# Patient Record
Sex: MALE | Race: WHITE | NOT HISPANIC OR LATINO | URBAN - METROPOLITAN AREA
[De-identification: names, ages, dates, MRNs, and addresses within clinical notes are randomized per-mention and may not be internally consistent; named-entity substitution may affect disease eponyms.]

---

## 2017-04-13 ENCOUNTER — OUTPATIENT (OUTPATIENT)
Dept: OUTPATIENT SERVICES | Facility: HOSPITAL | Age: 57
LOS: 1 days | Discharge: HOME | End: 2017-04-13
Payer: COMMERCIAL

## 2017-04-13 DIAGNOSIS — M54.16 RADICULOPATHY, LUMBAR REGION: ICD-10-CM

## 2017-04-13 PROCEDURE — 62322 NJX INTERLAMINAR LMBR/SAC: CPT

## 2017-04-13 PROCEDURE — 77003 FLUOROGUIDE FOR SPINE INJECT: CPT

## 2017-08-03 ENCOUNTER — OUTPATIENT (OUTPATIENT)
Dept: OUTPATIENT SERVICES | Facility: HOSPITAL | Age: 57
LOS: 1 days | Discharge: HOME | End: 2017-08-03
Payer: COMMERCIAL

## 2017-08-03 DIAGNOSIS — M54.16 RADICULOPATHY, LUMBAR REGION: ICD-10-CM

## 2017-08-03 PROCEDURE — 77003 FLUOROGUIDE FOR SPINE INJECT: CPT

## 2017-08-03 PROCEDURE — 62323 NJX INTERLAMINAR LMBR/SAC: CPT

## 2017-09-11 ENCOUNTER — OUTPATIENT (OUTPATIENT)
Dept: OUTPATIENT SERVICES | Facility: HOSPITAL | Age: 57
LOS: 1 days | Discharge: HOME | End: 2017-09-11
Payer: COMMERCIAL

## 2017-09-11 DIAGNOSIS — M47.817 SPONDYLOSIS WITHOUT MYELOPATHY OR RADICULOPATHY, LUMBOSACRAL REGION: ICD-10-CM

## 2017-09-11 PROCEDURE — 64635 DESTROY LUMB/SAC FACET JNT: CPT | Mod: LT

## 2017-09-11 PROCEDURE — 77003 FLUOROGUIDE FOR SPINE INJECT: CPT

## 2017-09-11 PROCEDURE — 64636 DESTROY L/S FACET JNT ADDL: CPT | Mod: LT

## 2017-09-25 ENCOUNTER — OUTPATIENT (OUTPATIENT)
Dept: OUTPATIENT SERVICES | Facility: HOSPITAL | Age: 57
LOS: 1 days | End: 2017-09-25
Payer: COMMERCIAL

## 2017-09-25 DIAGNOSIS — M47.817 SPONDYLOSIS WITHOUT MYELOPATHY OR RADICULOPATHY, LUMBOSACRAL REGION: ICD-10-CM

## 2017-09-25 PROCEDURE — 77003 FLUOROGUIDE FOR SPINE INJECT: CPT

## 2017-09-25 PROCEDURE — 64635 DESTROY LUMB/SAC FACET JNT: CPT | Mod: RT

## 2017-09-25 PROCEDURE — 64636 DESTROY L/S FACET JNT ADDL: CPT | Mod: RT

## 2020-12-03 PROBLEM — Z00.00 ENCOUNTER FOR PREVENTIVE HEALTH EXAMINATION: Status: ACTIVE | Noted: 2020-12-03

## 2020-12-09 ENCOUNTER — APPOINTMENT (OUTPATIENT)
Dept: PULMONOLOGY | Facility: CLINIC | Age: 60
End: 2020-12-09

## 2020-12-23 ENCOUNTER — APPOINTMENT (OUTPATIENT)
Dept: PULMONOLOGY | Facility: CLINIC | Age: 60
End: 2020-12-23
Payer: OTHER MISCELLANEOUS

## 2020-12-23 VITALS
HEIGHT: 62 IN | DIASTOLIC BLOOD PRESSURE: 60 MMHG | RESPIRATION RATE: 16 BRPM | BODY MASS INDEX: 36.44 KG/M2 | OXYGEN SATURATION: 99 % | WEIGHT: 198 LBS | SYSTOLIC BLOOD PRESSURE: 120 MMHG | HEART RATE: 73 BPM

## 2020-12-23 DIAGNOSIS — Z82.5 FAMILY HISTORY OF ASTHMA AND OTHER CHRONIC LOWER RESPIRATORY DISEASES: ICD-10-CM

## 2020-12-23 DIAGNOSIS — Z83.518 FAMILY HISTORY OF OTHER SPECIFIED EYE DISORDER: ICD-10-CM

## 2020-12-23 DIAGNOSIS — I10 ESSENTIAL (PRIMARY) HYPERTENSION: ICD-10-CM

## 2020-12-23 DIAGNOSIS — K61.1 RECTAL ABSCESS: ICD-10-CM

## 2020-12-23 DIAGNOSIS — Z80.1 FAMILY HISTORY OF MALIGNANT NEOPLASM OF TRACHEA, BRONCHUS AND LUNG: ICD-10-CM

## 2020-12-23 DIAGNOSIS — Z87.891 PERSONAL HISTORY OF NICOTINE DEPENDENCE: ICD-10-CM

## 2020-12-23 DIAGNOSIS — Z82.0 FAMILY HISTORY OF EPILEPSY AND OTHER DISEASES OF THE NERVOUS SYSTEM: ICD-10-CM

## 2020-12-23 DIAGNOSIS — F52.21 MALE ERECTILE DISORDER: ICD-10-CM

## 2020-12-23 DIAGNOSIS — Z83.49 FAMILY HISTORY OF OTHER ENDOCRINE, NUTRITIONAL AND METABOLIC DISEASES: ICD-10-CM

## 2020-12-23 PROCEDURE — 99072 ADDL SUPL MATRL&STAF TM PHE: CPT

## 2020-12-23 PROCEDURE — 99204 OFFICE O/P NEW MOD 45 MIN: CPT

## 2020-12-23 RX ORDER — TADALAFIL 20 MG/1
20 TABLET ORAL
Refills: 0 | Status: ACTIVE | COMMUNITY

## 2020-12-23 RX ORDER — METHOCARBAMOL 750 MG/1
750 TABLET, FILM COATED ORAL
Refills: 0 | Status: ACTIVE | COMMUNITY

## 2020-12-23 NOTE — DISCUSSION/SUMMARY
[FreeTextEntry1] : 60-year-old male, seen today for complaints of a chronic cough. History is significant for tobacco use asbestos exposure as well as continued marijuana use. Differential diagnosis includes COPD, reactive airways, interstitial lung disease, asbestosis.

## 2020-12-23 NOTE — HISTORY OF PRESENT ILLNESS
[Former] : former [Current] : current [TextBox_4] : 60-year-old male with a greater than 80 pack year history of cigarette smoking . DC 10 years ago. Patient presents with symptoms of chronic cough, which she, states she's had for years. He has had asbestos exposure as a , which he is retired from. Cough is greatest in the morning, usually associated with streaking sputum. He walks approximately 3-4 miles per day without any limitation. There is no seasonal variation, new exposures, paroxysmal nocturnal dyspnea, or orthopnea. He has no complaints of snoring, unrefreshing sleep, daytime somnolence\par \par   [TextBox_11] : 2 [TextBox_13] : 40 [YearQuit] : 2010 [ESS] : 4

## 2021-01-07 ENCOUNTER — APPOINTMENT (OUTPATIENT)
Dept: THORACIC SURGERY | Facility: CLINIC | Age: 61
End: 2021-01-07
Payer: MEDICARE

## 2021-01-07 VITALS — WEIGHT: 185 LBS | BODY MASS INDEX: 34.04 KG/M2 | HEIGHT: 62 IN

## 2021-01-07 PROCEDURE — G0296 VISIT TO DETERM LDCT ELIG: CPT

## 2021-01-07 NOTE — REASON FOR VISIT
[Home] : at home, [unfilled] , at the time of the visit. [Other Location: e.g. Home (Enter Location, City,State)___] : at [unfilled] [Verbal consent obtained from patient] : the patient, [unfilled] [Initial Evaluation] : an initial evaluation visit [Review of Eligibility] : review of eligibility [Low-Dose CT Screening Discussion] : low-dose CT lung cancer screening discussion

## 2021-01-07 NOTE — HISTORY OF PRESENT ILLNESS
[TextBox_13] : Referred by Dr. Adriano aTylor.\par \par Mr. TAVARES is a 60 year old male with a history of HTN, chronic bronchitis and PNA.\par \par He was called to review eligibility for Low-Dose CT lung cancer screening.  Reviewed and confirmed that the patient meets screening eligibility criteria:\par \par 60 years old \par \par Smoking Status: Former smoker\par \par Number of pack(s) per day: 2\par Number of years smoked: 40\par Number of pack years smokin\par \par Number of years since quitting smokin\par Quit year: \par \par Mr. TAVARES denies any symptoms of lung cancer, including new cough, change in cough, hemoptysis, and unintentional weight loss.\par \par Mr. TAVARES denies any personal history of lung cancer.  Admits lung cancer in a first degree relative, his mother.  Admits to exposure to asbestos.

## 2021-01-12 ENCOUNTER — OUTPATIENT (OUTPATIENT)
Dept: OUTPATIENT SERVICES | Facility: HOSPITAL | Age: 61
LOS: 1 days | End: 2021-01-12
Payer: COMMERCIAL

## 2021-01-12 ENCOUNTER — RESULT REVIEW (OUTPATIENT)
Age: 61
End: 2021-01-12

## 2021-01-12 ENCOUNTER — APPOINTMENT (OUTPATIENT)
Dept: CT IMAGING | Facility: CLINIC | Age: 61
End: 2021-01-12
Payer: OTHER MISCELLANEOUS

## 2021-01-12 DIAGNOSIS — Z87.891 PERSONAL HISTORY OF NICOTINE DEPENDENCE: ICD-10-CM

## 2021-01-12 PROCEDURE — 71271 CT THORAX LUNG CANCER SCR C-: CPT

## 2021-01-12 PROCEDURE — 71271 CT THORAX LUNG CANCER SCR C-: CPT | Mod: 26

## 2021-01-19 DIAGNOSIS — Z01.818 ENCOUNTER FOR OTHER PREPROCEDURAL EXAMINATION: ICD-10-CM

## 2021-01-26 ENCOUNTER — APPOINTMENT (OUTPATIENT)
Dept: DISASTER EMERGENCY | Facility: CLINIC | Age: 61
End: 2021-01-26

## 2021-01-27 LAB — SARS-COV-2 N GENE NPH QL NAA+PROBE: NOT DETECTED

## 2021-01-29 ENCOUNTER — APPOINTMENT (OUTPATIENT)
Dept: PULMONOLOGY | Facility: CLINIC | Age: 61
End: 2021-01-29
Payer: OTHER MISCELLANEOUS

## 2021-01-29 VITALS
SYSTOLIC BLOOD PRESSURE: 140 MMHG | HEART RATE: 77 BPM | RESPIRATION RATE: 16 BRPM | OXYGEN SATURATION: 97 % | DIASTOLIC BLOOD PRESSURE: 80 MMHG

## 2021-01-29 VITALS — BODY MASS INDEX: 34.6 KG/M2 | WEIGHT: 188 LBS | HEIGHT: 62 IN

## 2021-01-29 PROCEDURE — 99072 ADDL SUPL MATRL&STAF TM PHE: CPT

## 2021-01-29 PROCEDURE — G0296 VISIT TO DETERM LDCT ELIG: CPT

## 2021-01-29 PROCEDURE — 99215 OFFICE O/P EST HI 40 MIN: CPT | Mod: 25

## 2021-01-29 PROCEDURE — 85018 HEMOGLOBIN: CPT | Mod: QW

## 2021-01-29 PROCEDURE — 94727 GAS DIL/WSHOT DETER LNG VOL: CPT

## 2021-01-29 PROCEDURE — 94729 DIFFUSING CAPACITY: CPT

## 2021-01-29 PROCEDURE — 94010 BREATHING CAPACITY TEST: CPT

## 2021-01-29 NOTE — COUNSELING
[ - Annual Lung Cancer Screening/Share Decision Making Discussion] : Annual Lung Cancer Screening/Share Decision Making Discussion. (I have advised this patient to have a Low Dose CT (LDCT) scan of the lungs and have discussed the following with the patient in a shared decision making discussion:   Benefits of Detection and Early Treatment: There is adequate evidence that annual screening for lung cancer with LDCT in a population of high-risk persons can prevent a substantial number of lung cancer–related deaths. The magnitude of benefit depends on the individual patient's risk for lung cancer, as those who are at highest risk are most likely to benefit. Screening cannot prevent most lung cancer–related deaths, and does not replace smoking cessation. Harms of Detection and Early Intervention and Treatment: The harms associated with LDCT screening include false-negative and false-positive results, incidental findings, over diagnosis, and radiation exposure. False-positive LDCT results occur in a substantial proportion of screened persons; 95% of all positive results do not lead to a diagnosis of cancer. In a high-quality screening program, further imaging can resolve most false-positive results; however, some patients may require invasive procedures. Radiation harms, including cancer resulting from cumulative exposure to radiation, vary depending on the age at the start of screening; the number of scans received; and the person's exposure to other sources of radiation, particularly other medical imaging.) [FreeTextEntry2] : Former smoker

## 2021-01-29 NOTE — HISTORY OF PRESENT ILLNESS
[Former] : former [>= 30 pack years] : >= 30 pack years [Lung Cancer Screening] : Patient underwent lung cancer screening [1] : 1 [TextBox_4] : 60-year-old female with 80 pack year history of cigarette smoking. DC 10 years ago. Patient was last seen on 12/23/20 for complaints of chronic cough. This was felt to be on the basis of reactive airways disease, COPD, questionable interstitial lung disease or even asbestosis.\par \par Patient has not had any significant change in his cough syndrome since last seen. He continues to smoke marijuana on a regular basis with increased cough after smoking. He does note some mild shortness of breath. He has been on losartan, which is an angiotensin II RB x many yrs [TextBox_13] : 40 [TextBox_11] : 2 [YearQuit] : 2010 [ESS] : 0 [TextBox_12] : 1/14/21

## 2021-01-29 NOTE — DISCUSSION/SUMMARY
[FreeTextEntry1] : 60-year-old male, seen today for the above. Patient is no evidence of intrinsic lung disease on pulmonary functions or suspicious lesions on low-dose chest CT. His cough is most likely on the basis of chronic smoking and for marijuana, but may also be on the basis of ARB. With his dyspnea. He is being referred to cardiology to address his antihypertensives, as well as rule out underlying coronary disease. We will continue to follow here regarding cancer screening and annual pulmonary function test

## 2021-10-06 PROBLEM — I10 ESSENTIAL HYPERTENSION: Status: ACTIVE | Noted: 2020-12-23

## 2023-07-11 ENCOUNTER — APPOINTMENT (OUTPATIENT)
Dept: PULMONOLOGY | Facility: CLINIC | Age: 63
End: 2023-07-11
Payer: MEDICARE

## 2023-07-11 VITALS
WEIGHT: 185 LBS | SYSTOLIC BLOOD PRESSURE: 128 MMHG | OXYGEN SATURATION: 98 % | BODY MASS INDEX: 34.04 KG/M2 | DIASTOLIC BLOOD PRESSURE: 70 MMHG | RESPIRATION RATE: 16 BRPM | HEART RATE: 68 BPM | HEIGHT: 62 IN

## 2023-07-11 DIAGNOSIS — Z87.891 PERSONAL HISTORY OF NICOTINE DEPENDENCE: ICD-10-CM

## 2023-07-11 PROCEDURE — G0296 VISIT TO DETERM LDCT ELIG: CPT

## 2023-07-11 PROCEDURE — 99214 OFFICE O/P EST MOD 30 MIN: CPT | Mod: 25

## 2023-07-11 RX ORDER — MELOXICAM 15 MG/1
TABLET ORAL
Refills: 0 | Status: DISCONTINUED | COMMUNITY
End: 2023-07-11

## 2023-07-11 NOTE — DISCUSSION/SUMMARY
[FreeTextEntry1] : 63-year-old male seen today for the above.  Patient's complaints appear to nearly be resolved and most like on the basis of acute sinusitis possibly complicated by reactive airways disease from his history of tobacco use.\par \par Due to the patient's history of prior tobacco use they fulfill criteria for low dose, lung cancer screening. This method was described to the patient with criteria for greater than 20 pack years of smoking, over the age of 50, and screening for 15 years following cessation of tobacco use or until age 80\par

## 2023-07-11 NOTE — HISTORY OF PRESENT ILLNESS
[Former] : former [TextBox_4] : 63-year-old male with a significant past history of tobacco use last seen in this office in January 2021.  At that time he had no evidence of intrinsic lung disease on pulmonary functions or suspicious lesions on low-dose CAT scan.  His cough was felt to be mostly on the basis of chronic marijuana smoking versus reactive air disease or on the basis of ARB.  He was was referred to cardiology to rule out underlying coronary disease.\par \par Patient presents today after developing cough on and about 6/24/2023.  States that he noted significant sinus headache and postnasal drip which rapidly progressed to severe cough.  He did complain of wheezing as well as chest tightness.  He was seen in urgent care center in [place] and was given 5-day course of prednisone.  Unfortunately the patient does develop a hyperactive response to prednisone but did complete therapy in addition was also given dicyclomine, Symbicort as well as an albuterol inhaler which she continues to use he gradually continue to improve with near complete resolution.  He still using both Symbicort as well as drug nebulization at least twice a day.  There is no history of fevers chills lightheadedness or dizziness.\par \par Patient continues to smoke pot.  He remains off cigarettes.  No other interval change in his medical care [TextBox_11] : 2 [TextBox_13] : 40 [YearQuit] : 2010

## 2023-07-21 ENCOUNTER — NON-APPOINTMENT (OUTPATIENT)
Age: 63
End: 2023-07-21

## 2023-07-21 VITALS — HEIGHT: 62 IN | WEIGHT: 185 LBS | BODY MASS INDEX: 34.04 KG/M2

## 2023-07-21 DIAGNOSIS — Z87.891 PERSONAL HISTORY OF NICOTINE DEPENDENCE: ICD-10-CM

## 2023-07-21 NOTE — HISTORY OF PRESENT ILLNESS
[Former] : Former [TextBox_13] : Referred by Dr. Adriano Taylor.\par \par Mr. TAVARES is a 63 year old male with a history of HTN, chronic bronchitis and PNA. Reviewed and confirmed that the patient meets screening eligibility criteria:\par \par 63 years old \par \par Smoking Status: Former smoker\par \par Number of pack(s) per day: 2\par Number of years smoked: 40\par Number of pack years smokin\par \par Number of years since quitting smokin\par Quit year: \par \par No symptoms of lung cancer, including new cough, change in cough, hemoptysis, and unintentional weight loss.\par \par No personal history of lung cancer. History of lung cancer in a first degree relative, his mother. History of exposure to asbestos.  [YearQuit] : 2010 [PacksperYear] : 80

## 2023-07-24 ENCOUNTER — APPOINTMENT (OUTPATIENT)
Dept: CT IMAGING | Facility: CLINIC | Age: 63
End: 2023-07-24
Payer: MEDICARE

## 2023-07-24 ENCOUNTER — OUTPATIENT (OUTPATIENT)
Dept: OUTPATIENT SERVICES | Facility: HOSPITAL | Age: 63
LOS: 1 days | End: 2023-07-24
Payer: MEDICARE

## 2023-07-24 DIAGNOSIS — Z87.891 PERSONAL HISTORY OF NICOTINE DEPENDENCE: ICD-10-CM

## 2023-07-24 PROCEDURE — 71271 CT THORAX LUNG CANCER SCR C-: CPT | Mod: 26

## 2023-07-24 PROCEDURE — 71271 CT THORAX LUNG CANCER SCR C-: CPT

## 2023-07-28 ENCOUNTER — NON-APPOINTMENT (OUTPATIENT)
Age: 63
End: 2023-07-28

## 2023-10-02 ENCOUNTER — APPOINTMENT (OUTPATIENT)
Dept: PULMONOLOGY | Facility: CLINIC | Age: 63
End: 2023-10-02

## 2023-11-01 ENCOUNTER — APPOINTMENT (OUTPATIENT)
Dept: PULMONOLOGY | Facility: CLINIC | Age: 63
End: 2023-11-01

## 2024-06-20 ENCOUNTER — APPOINTMENT (OUTPATIENT)
Dept: PULMONOLOGY | Facility: CLINIC | Age: 64
End: 2024-06-20
Payer: MEDICARE

## 2024-06-20 VITALS — WEIGHT: 190 LBS | HEIGHT: 61.7 IN | BODY MASS INDEX: 34.96 KG/M2

## 2024-06-20 VITALS
HEART RATE: 68 BPM | OXYGEN SATURATION: 98 % | RESPIRATION RATE: 16 BRPM | SYSTOLIC BLOOD PRESSURE: 120 MMHG | DIASTOLIC BLOOD PRESSURE: 68 MMHG

## 2024-06-20 DIAGNOSIS — R05.3 CHRONIC COUGH: ICD-10-CM

## 2024-06-20 PROCEDURE — 94010 BREATHING CAPACITY TEST: CPT

## 2024-06-20 PROCEDURE — G0296 VISIT TO DETERM LDCT ELIG: CPT

## 2024-06-20 PROCEDURE — 99215 OFFICE O/P EST HI 40 MIN: CPT | Mod: 25

## 2024-06-20 RX ORDER — LOSARTAN POTASSIUM 100 MG/1
TABLET, FILM COATED ORAL
Refills: 0 | Status: DISCONTINUED | COMMUNITY
End: 2024-06-20

## 2024-06-20 RX ORDER — ALBUTEROL 90 MCG
90 AEROSOL (GRAM) INHALATION
Refills: 0 | Status: ACTIVE | COMMUNITY

## 2024-06-20 RX ORDER — ALBUTEROL SULFATE 2.5 MG/3ML
(2.5 MG/3ML) SOLUTION RESPIRATORY (INHALATION)
Refills: 0 | Status: ACTIVE | COMMUNITY

## 2024-06-20 RX ORDER — BUDESONIDE AND FORMOTEROL FUMARATE DIHYDRATE 160; 4.5 UG/1; UG/1
160-4.5 AEROSOL RESPIRATORY (INHALATION)
Refills: 0 | Status: ACTIVE | COMMUNITY

## 2024-06-20 RX ORDER — ROSUVASTATIN CALCIUM 5 MG/1
5 TABLET, FILM COATED ORAL
Refills: 0 | Status: ACTIVE | COMMUNITY

## 2024-06-20 RX ORDER — TIZANIDINE HYDROCHLORIDE 2 MG/1
2 CAPSULE ORAL
Refills: 0 | Status: ACTIVE | COMMUNITY

## 2024-06-20 RX ORDER — FEXOFENADINE HCL 60 MG/1
60 TABLET, FILM COATED ORAL
Refills: 0 | Status: ACTIVE | COMMUNITY

## 2024-06-20 RX ORDER — DICLOFENAC POTASSIUM 50 MG/1
50 POWDER, FOR SOLUTION ORAL
Refills: 0 | Status: ACTIVE | COMMUNITY

## 2024-06-20 RX ORDER — LOSARTAN POTASSIUM AND HYDROCHLOROTHIAZIDE 25; 100 MG/1; MG/1
100-25 TABLET ORAL
Refills: 0 | Status: ACTIVE | COMMUNITY

## 2024-06-20 NOTE — HISTORY OF PRESENT ILLNESS
[>= 20 pack years] : >= 20 pack years [TextBox_4] : 63-year-old male last seen in July 2023 for complaints of cough.  Patient is a former 80 pack year cigarette smoker DC'd in 2010 but he continues to smoke pot intermittently.  Previous cough was complicated by severe sinus disease which most likely precipitated reactive airways.  He is being seen today for routine follow-up and review of his recent CAT scan.  Patient states that since last seen he had missed an appointment because of the death of his brother and has required the use of as needed Symbicort and albuterol several times.  He has not used either in several months.  No history of increased dyspnea on exertion.  History is negative for chest pains palpitations lightheadedness or dizziness [YearQuit] : 2010

## 2024-06-20 NOTE — CONSULT LETTER
[Dear  ___] : Dear  [unfilled], [Consult Letter:] : I had the pleasure of evaluating your patient, [unfilled]. [Please see my note below.] : Please see my note below. [Consult Closing:] : Thank you very much for allowing me to participate in the care of this patient.  If you have any questions, please do not hesitate to contact me. [Sincerely,] : Sincerely, [FreeTextEntry3] : Adriano Taylor MD FCCP Pulmonary/Critical Care/Sleep Medicine Department of Internal Medicine  [facility]

## 2024-06-20 NOTE — DISCUSSION/SUMMARY
[FreeTextEntry1] : 63-year-old male seen today for the above.  Patient's findings on CAT scan consistent with old inflammatory disease and the patient still remains a candidate for low-dose chest CT for an additional year.  In regard to his reactive airways he most likely has stage I chronic obstructive lung disease and has been instructed to be more liberal using more liberal albuterol and if he does require twice daily use of his Symbicort.  If this continues he will be maintained on a long-acting beta agonist/inhaled corticosteroid.

## 2024-06-20 NOTE — END OF VISIT
[Time Spent: ___ minutes] : I have spent [unfilled] minutes of time on the encounter. [FreeTextEntry3] : CT reviewed on PACS

## 2024-06-20 NOTE — RESULTS/DATA
[TextEntry] : 5/14/2024: [facility] low-dose chest CT.-Previous CAT scans were performed at [facility] so no comparisons were made by this radiology group.  3 mm noncalcified pulmonary nodule seen at the left base which was unchanged from May 2023.  (Films reviewed on PACS)

## 2024-12-17 ENCOUNTER — APPOINTMENT (OUTPATIENT)
Dept: PULMONOLOGY | Facility: CLINIC | Age: 64
End: 2024-12-17
Payer: MEDICARE

## 2024-12-17 VITALS — WEIGHT: 192 LBS | HEIGHT: 61.5 IN | BODY MASS INDEX: 35.79 KG/M2

## 2024-12-17 VITALS
HEART RATE: 75 BPM | OXYGEN SATURATION: 98 % | SYSTOLIC BLOOD PRESSURE: 130 MMHG | DIASTOLIC BLOOD PRESSURE: 80 MMHG | RESPIRATION RATE: 16 BRPM

## 2024-12-17 DIAGNOSIS — R05.3 CHRONIC COUGH: ICD-10-CM

## 2024-12-17 DIAGNOSIS — J45.901 UNSPECIFIED ASTHMA WITH (ACUTE) EXACERBATION: ICD-10-CM

## 2024-12-17 DIAGNOSIS — Z87.891 PERSONAL HISTORY OF NICOTINE DEPENDENCE: ICD-10-CM

## 2024-12-17 PROCEDURE — G0296 VISIT TO DETERM LDCT ELIG: CPT

## 2024-12-17 PROCEDURE — 94010 BREATHING CAPACITY TEST: CPT

## 2024-12-17 PROCEDURE — 99214 OFFICE O/P EST MOD 30 MIN: CPT | Mod: 25

## 2024-12-17 RX ORDER — UMECLIDINIUM 62.5 UG/1
62.5 AEROSOL, POWDER ORAL DAILY
Qty: 1 | Refills: 5 | Status: ACTIVE | COMMUNITY
Start: 2024-12-17 | End: 1900-01-01

## 2025-04-29 ENCOUNTER — OUTPATIENT (OUTPATIENT)
Dept: OUTPATIENT SERVICES | Facility: HOSPITAL | Age: 65
LOS: 1 days | End: 2025-04-29
Payer: MEDICARE

## 2025-04-29 DIAGNOSIS — M54.16 RADICULOPATHY, LUMBAR REGION: ICD-10-CM

## 2025-04-29 PROCEDURE — 64484 NJX AA&/STRD TFRM EPI L/S EA: CPT | Mod: RT

## 2025-04-29 PROCEDURE — 76000 FLUOROSCOPY <1 HR PHYS/QHP: CPT

## 2025-04-29 PROCEDURE — 64483 NJX AA&/STRD TFRM EPI L/S 1: CPT | Mod: RT

## 2025-05-07 ENCOUNTER — RX RENEWAL (OUTPATIENT)
Age: 65
End: 2025-05-07

## 2025-05-12 ENCOUNTER — NON-APPOINTMENT (OUTPATIENT)
Age: 65
End: 2025-05-12

## 2025-05-12 VITALS — HEIGHT: 61.5 IN | BODY MASS INDEX: 35.79 KG/M2 | WEIGHT: 192 LBS

## 2025-05-12 DIAGNOSIS — Z87.891 PERSONAL HISTORY OF NICOTINE DEPENDENCE: ICD-10-CM

## 2025-05-16 ENCOUNTER — APPOINTMENT (OUTPATIENT)
Dept: CT IMAGING | Facility: CLINIC | Age: 65
End: 2025-05-16

## 2025-05-16 ENCOUNTER — OUTPATIENT (OUTPATIENT)
Dept: OUTPATIENT SERVICES | Facility: HOSPITAL | Age: 65
LOS: 1 days | End: 2025-05-16
Payer: MEDICARE

## 2025-05-16 DIAGNOSIS — R05.3 CHRONIC COUGH: ICD-10-CM

## 2025-05-16 DIAGNOSIS — Z87.891 PERSONAL HISTORY OF NICOTINE DEPENDENCE: ICD-10-CM

## 2025-05-16 PROCEDURE — 71271 CT THORAX LUNG CANCER SCR C-: CPT | Mod: 26

## 2025-06-03 ENCOUNTER — OUTPATIENT (OUTPATIENT)
Dept: OUTPATIENT SERVICES | Facility: HOSPITAL | Age: 65
LOS: 1 days | End: 2025-06-03
Payer: MEDICARE

## 2025-06-03 ENCOUNTER — TRANSCRIPTION ENCOUNTER (OUTPATIENT)
Age: 65
End: 2025-06-03

## 2025-06-03 DIAGNOSIS — M54.16 RADICULOPATHY, LUMBAR REGION: ICD-10-CM

## 2025-06-03 PROCEDURE — 62323 NJX INTERLAMINAR LMBR/SAC: CPT

## 2025-06-03 PROCEDURE — 77003 FLUOROGUIDE FOR SPINE INJECT: CPT

## 2025-06-07 ENCOUNTER — NON-APPOINTMENT (OUTPATIENT)
Age: 65
End: 2025-06-07

## 2025-06-09 ENCOUNTER — APPOINTMENT (OUTPATIENT)
Dept: PULMONOLOGY | Facility: CLINIC | Age: 65
End: 2025-06-09
Payer: MEDICARE

## 2025-06-09 VITALS — WEIGHT: 194 LBS | HEIGHT: 62 IN | BODY MASS INDEX: 35.7 KG/M2

## 2025-06-09 VITALS
RESPIRATION RATE: 16 BRPM | OXYGEN SATURATION: 98 % | SYSTOLIC BLOOD PRESSURE: 125 MMHG | HEART RATE: 70 BPM | DIASTOLIC BLOOD PRESSURE: 80 MMHG

## 2025-06-09 PROCEDURE — 94010 BREATHING CAPACITY TEST: CPT

## 2025-06-09 PROCEDURE — 99215 OFFICE O/P EST HI 40 MIN: CPT | Mod: 25

## 2025-06-09 RX ORDER — ALBUTEROL SULFATE 90 UG/1
108 (90 BASE) INHALANT RESPIRATORY (INHALATION)
Qty: 1 | Refills: 5 | Status: ACTIVE | COMMUNITY
Start: 2025-06-09 | End: 1900-01-01